# Patient Record
Sex: MALE | URBAN - METROPOLITAN AREA
[De-identification: names, ages, dates, MRNs, and addresses within clinical notes are randomized per-mention and may not be internally consistent; named-entity substitution may affect disease eponyms.]

---

## 2022-12-01 ENCOUNTER — OFFICE VISIT (OUTPATIENT)
Dept: BEHAVIORAL HEALTH | Facility: CLINIC | Age: 32
End: 2022-12-01
Payer: COMMERCIAL

## 2022-12-01 VITALS — SYSTOLIC BLOOD PRESSURE: 145 MMHG | DIASTOLIC BLOOD PRESSURE: 97 MMHG | OXYGEN SATURATION: 97 % | HEART RATE: 69 BPM

## 2022-12-01 DIAGNOSIS — F11.20 OPIOID USE DISORDER, MODERATE, DEPENDENCE (H): Primary | ICD-10-CM

## 2022-12-01 DIAGNOSIS — F11.93 OPIOID WITHDRAWAL (H): ICD-10-CM

## 2022-12-01 PROCEDURE — 80307 DRUG TEST PRSMV CHEM ANLYZR: CPT | Performed by: NURSE PRACTITIONER

## 2022-12-01 PROCEDURE — 99204 OFFICE O/P NEW MOD 45 MIN: CPT | Performed by: NURSE PRACTITIONER

## 2022-12-01 RX ORDER — CLONIDINE HYDROCHLORIDE 0.1 MG/1
0.1 TABLET ORAL 3 TIMES DAILY PRN
Qty: 21 TABLET | Refills: 0 | Status: SHIPPED | OUTPATIENT
Start: 2022-12-01 | End: 2022-12-08

## 2022-12-01 RX ORDER — GABAPENTIN 300 MG/1
300 CAPSULE ORAL 3 TIMES DAILY PRN
Qty: 21 CAPSULE | Refills: 0 | Status: SHIPPED | OUTPATIENT
Start: 2022-12-01 | End: 2022-12-08

## 2022-12-01 RX ORDER — BUPRENORPHINE AND NALOXONE 8; 2 MG/1; MG/1
1 FILM, SOLUBLE BUCCAL; SUBLINGUAL 2 TIMES DAILY
Qty: 14 FILM | Refills: 0 | Status: SHIPPED | OUTPATIENT
Start: 2022-12-01 | End: 2022-12-08

## 2022-12-01 RX ORDER — ONDANSETRON 4 MG/1
4 TABLET, ORALLY DISINTEGRATING ORAL EVERY 8 HOURS PRN
Qty: 12 TABLET | Refills: 0 | Status: SHIPPED | OUTPATIENT
Start: 2022-12-01 | End: 2022-12-08

## 2022-12-01 ASSESSMENT — COLUMBIA-SUICIDE SEVERITY RATING SCALE - C-SSRS
6. HAVE YOU EVER DONE ANYTHING, STARTED TO DO ANYTHING, OR PREPARED TO DO ANYTHING TO END YOUR LIFE?: NO
TOTAL  NUMBER OF ABORTED OR SELF INTERRUPTED ATTEMPTS LIFETIME: NO
TOTAL  NUMBER OF INTERRUPTED ATTEMPTS LIFETIME: NO
ATTEMPT LIFETIME: NO
1. HAVE YOU WISHED YOU WERE DEAD OR WISHED YOU COULD GO TO SLEEP AND NOT WAKE UP?: NO
2. HAVE YOU ACTUALLY HAD ANY THOUGHTS OF KILLING YOURSELF?: NO

## 2022-12-01 ASSESSMENT — PATIENT HEALTH QUESTIONNAIRE - PHQ9: SUM OF ALL RESPONSES TO PHQ QUESTIONS 1-9: 18

## 2022-12-01 NOTE — PROGRESS NOTES
[facility] Initial Visit    ASSESSMENT/PLAN                                                      1. Opioid use disorder, moderate, dependence (H)  - pt reports ~4 mo hx of fentanyl use, daily or every other day, hx significant for heroin use starting at age 20 yo, sustained remission ~ 10 yrs.   - Reviewed criteria met for OUD moderate. Reviewed available treatment options, MOUD. Plan to start Suboxone at this time. Reviewed home induction instructions. Wait at least 24 hours from last use prior to first dose of Suboxone. Instructions printed in AVS.   - Infectious disease screening as below   - Hepatitis C Screen Reflex to HCV RNA Quant and Genotype; Future  - HIV Antigen Antibody Combo; Future  - Hepatitis B Surface Antibody; Future  - Hepatitis B surface antigen; Future  - buprenorphine HCl-naloxone HCl (SUBOXONE) 8-2 MG per film; Place 1 Film under the tongue 2 times daily for 7 days After following home start instructions  Dispense: 14 Film; Refill: 0  - Fentanyl Urine, Qualitative; Future  - naloxone (NARCAN) 4 MG/0.1ML nasal spray; Spray 1 spray (4 mg) into one nostril alternating nostrils as needed for opioid reversal every 2-3 minutes until assistance arrives  Dispense: 0.2 mL; Refill: 11    2. Opioid withdrawal (H)  - Reviewed medications to treat opioid withdrawal symptoms as below.   - buprenorphine HCl-naloxone HCl (SUBOXONE) 8-2 MG per film; Place 1 Film under the tongue 2 times daily for 7 days After following home start instructions  Dispense: 14 Film; Refill: 0  - gabapentin (NEURONTIN) 300 MG capsule; Take 1 capsule (300 mg) by mouth 3 times daily as needed for other (withdrawal symptoms including anxiety, restlessness, sleeplessness)  Dispense: 21 capsule; Refill: 0  - cloNIDine (CATAPRES) 0.1 MG tablet; Take 1 tablet (0.1 mg) by mouth 3 times daily as needed (opioid withdrawal, anxiety, restlessness, irritability, hot/cold flashes)  Dispense: 21 tablet; Refill: 0  -  "ondansetron (ZOFRAN ODT) 4 MG ODT tab; Take 1 tablet (4 mg) by mouth every 8 hours as needed for nausea  Dispense: 12 tablet; Refill: 0    Pt denies suicidal thoughts or plan. No intent to harm self or others.      Return in about 1 week (around 12/8/2022) for Follow up, in person, with any available provider.    Patient counseling completed today:  Discussed mechanism of action, potential risks/benefits/side effects of medications and other recommendations above.    Discussed risk of precipitated withdrawal with initiation of buprenorphine in the presence of full opioid agonists.    Reviewed directions for initiation of buprenorphine to reduce risk of precipitated withdrawal and maximize efficacy.    Harm reduction counseling including never use alone, availability of naloxone, avoiding combination of opioids with benzodiazepines, alcohol, or other sedatives, safer administration.      Discussed importance of avoiding isolation, building a network of supportive relationships, avoiding people/places/things associated with past use to reduce risk of relapse; including motivational interviewing regarding psychosocial treatment for addiction.     SUBJECTIVE                                                      CC/HPI:  Mendel Torres is a 32 year old male with PMH of anxiety and opioid use disorder who presents to the [facility] for initial visit.      Brief History:  Mendel Torres was first seen in Recovery Clinic on 12/01/22. He was referred by a friend. Reports he started taking opioid pain pills a couple months ago, for back pain. Reports he could not get pain relief, he went to the doctor and was prescribed muscle relaxants and anti-inflammatories as well as physical therapy. Reports he then started buying perc 30's off the street, use became daily a couple months ago. Currently using 2-4 pills every other day to every day. Reports \"it has gone to far\" \" I want to be done with them\" Endorses a history of " heroin use 10 years ago, was able to stop using heroin without MOUD. Was 18 yo when he tried powdered heroin for the first time. Reports he did not struggle with sustaining remission from substance use at that time.  Last full opioid agonist use today, 12/1/22  at 11 AM.     Substance Use History :  Opioids:   Age at first use: 19  Current use: substance: Mbox 30's; quantity 2 per day; route: Insufflation ; timing of last use: 12/01/2022 about 11AM      IV drug use: Yes: Heroin [id number]   History of overdose: Yes: did not seek medical attention  Previous residential or outpatient treatments for addiction : No  Previous medication treatments for addiction: No  Longest period of sobriety: 10 years  Medical complications related to substance use: No  Hepatitis C: pt reports he was told he had hepatitis C at one point - no history of treatment.   HIV: Unknown; Date of most recent testing: Unknown    Taking buprenorphine? No As prescribed? N/A  Number of buprenorphine films/tablets remaining currently: 0  Side effects related to buprenorphine N/A  Narcan currently available: No    DSM-5 OUD criteria met:  Taken in larger amounts/greater time spent in behavior over longer period of time than intended,Yes   Persistent desire or unsuccessful efforts to cut down or control use/behavior, Yes   A great deal of time is spent in activities necessary to obtain the substance/participate in the behavior or recover from its effects, Yes   Cravings, Yes   Recurrent use/behavior resulting in failure to fulfill major role obligations at work, school, or home, No - was let go from job but reports not related to substance use.   Continued use/behavior despite having persistent or recurrent social or interpersonal problems caused or exacerbated by effects of use/behavior, Yes   Important social, occupational, or recreational activities are given up or reduced because of use/behavior, No   Recurrent use/behavior in situations in which it  "is physically hazardous, No   Continued use/behavior despite knowledge of having a persistent or recurrent physical or psychological problem that is likely to have been caused or exacerbated by use/behavior, No   Tolerance, Yes    Withdrawal, Yes     Other Addiction History:  Stimulants (cocaine, methamphetamine, MDMA/ecstasy)   Cocaine and Ectasy years ago  Sedatives/hypnotics/anxiolytics: (benzodiazepines, GHB, Ambien, phenobarbital): Reports trying xanax but no sustained use. \"I was not able to get xanax prescribed\" \"I liked how I worked off xanax\" \"Helped me focus\"   Alcohol:   Denies  Nicotine: (cigarettes, vaping, chew/snuff)  Denies  Cannabis:   Yes- daily use.   Hallucinogens/Dissociatives: (acid, mushrooms, ketamine)  Yes- mushrooms a couple times this past year.   Eating disorder:  Denies  Gambling:   No    [place] Prescription Drug Monitoring Program Reviewed:  Yes    No past medical history on file.    PAST PSYCHIATRIC HISTORY:  Diagnoses- anxiety and depression   Suicide Attempts: No   Hospitalizations: No     PHQ 12/1/2022   PHQ-9 Total Score 18   Q9: Thoughts of better off dead/self-harm past 2 weeks Several days       If PHQ-9 score of 15 or higher, has [facility] therapist or provider been notified? Yes    Any current suicidal ideation? No  If yes, has [facility] therapist or provider been notified? Yes    Mental health provider: Denies (follow up on  referral if needed)    No past surgical history on file.    Medications:  busPIRone (BUSPAR) 10 MG tablet, Take 10 mg by mouth 2 times daily  cyclobenzaprine (FLEXERIL) 10 MG tablet, Take 10 mg by mouth every 8 hours as needed for muscle spasms  hydrOXYzine (ATARAX) 25 MG tablet, Take 25 mg by mouth every 8 hours as needed for itching  naproxen (NAPROSYN) 500 MG tablet, Take 500 mg by mouth every 12 hours as needed for moderate pain (4-6)    No current facility-administered medications on file prior to visit.    Allergies   Allergen " Reactions     Cats Shortness Of Breath and Itching     Seafood Anaphylaxis       No family history on file.      Social History  Housing status: alone   Employment status: Unemployed, seeking work - Previously working at [employer] recently let go.    Relationship status: Single  Children: 1 child, 6 yo 7 yo in Jan. 2023- joint custody   Legal: Denies  Insurance needs: Unsure if Active  Contact information up to date? No, current cell phone number [phone]    3rd Party Involvement None today (please obtain TANISHA if pt would like to include)    REVIEW OF SYSTEMS:  General: Withdrawal symptoms as described below.  No recent fevers.   Eyes:  No vision concerns.  No jaundice.    Resp: No coughing, wheezing or shortness of breath  CV: No chest pains or palpitations  GI: No complaints other than as above  : No urinary frequency or dysuria, no discharge  Musculoskeletal: No significant muscle or joint pains other than as above.  No edema  Neurologic: No numbness, tingling, weakness, problems with balance or coordination  Psychiatric: No acute concerns other than as above.   Skin: No rashes or areas of acute infection    OBJECTIVE                                                      Clinical Opioid Withdrawal Scale (COWS)  Resting Pulse Rate  0  =  <=80    Sweating    (over past 1/2 hour) 1  =  subjective report of chills or flushing   Restlessness  1  =  reports difficulty sitting still, but is able to do so   Pupil size  0  =  pupils pinned or normal size for room light   Bone or Joint Aches    (acute only) 1  =  mild diffuse discomfort   Runny nose or tearing    (unrelated to cold/allergies) 1  =  nasal stuffiness or unusually moist eyes   GI Upset    (over past 1/2 hour) 1  =  stomach cramps   Tremor    (outstretched hands) 0  =  no tremor   Yawning    (during assessment) 0  =  no yawning   Anxiety/Irritability 1  =  patient reports increasing irritability or anxiousness   Gooseflesh skin 0  =  skin is smooth     TOTAL  SCORE  Add column for score   6       BP (!) 145/97 (BP Location: Left arm, Patient Position: Sitting)   Pulse 69   SpO2 97%     Physical Exam  Vitals and nursing note reviewed.   Constitutional:       General: He is not in acute distress.     Appearance: Normal appearance. He is not ill-appearing or diaphoretic.   HENT:      Nose: Congestion present.   Eyes:      General: No scleral icterus.     Extraocular Movements: Extraocular movements intact.      Conjunctiva/sclera: Conjunctivae normal.   Cardiovascular:      Rate and Rhythm: Normal rate.   Pulmonary:      Effort: Pulmonary effort is normal.   Neurological:      General: No focal deficit present.      Mental Status: He is alert and oriented to person, place, and time.   Psychiatric:         Attention and Perception: Attention and perception normal.         Mood and Affect: Mood is depressed. Mood is not anxious. Affect is not flat or tearful.         Speech: Speech normal. Speech is not rapid and pressured or slurred.         Behavior: Behavior is not slowed or withdrawn. Behavior is cooperative.         Thought Content: Thought content normal. Thought content does not include suicidal ideation. Thought content does not include suicidal plan.         Cognition and Memory: Cognition and memory normal.      Comments: Insight and judgment fair          Labs:  UDS: Urine Drug Screen Results  AMP: Negative  BAR: Negative  BUP: Negative  BZO: Negative  JOSE M: Negative  mAMP: Negative  MDMA : Negative  MTD: Negative  [id number]: Negative  OXY: Negative  PCP : Negative  THC : Positive  *POC urine drug screen does not screen for Fentanyl    Recent Results (from the past 720 hour(s))   Fentanyl Urine, Qualitative    Collection Time: 12/01/22  2:51 PM   Result Value Ref Range    Fentanyl Qual Urine Screen Positive (A) Screen Negative       PARRIS Christensen CNP on 12/1/2022 at 2:02 PM  [facility]  [address], [address]  [place]  [address]  [phone]

## 2022-12-01 NOTE — NURSING NOTE
RN was notified by rooming staff that the patient had an elevated PHQ-9 score and answered greater than 0 on question 9 indicating thoughts that they would be better off dead, or hurting themself in some way. RN met with patient to complete the C-SSRS.    Patient Patient reported it was a mistake that he answered question nine on the PHQ-9 with a score of 1.     Patient endorses denies current or recent suicidal ideation or behavior    Clinch-Suicide Severity Rating Scale (C-SSRS) score: no risk    RN updated the provider with C-SSRS risk level.     Current stressors include:     Patient denies    Patient identified the following protective factors: commitment to family    Patient was given the number for the National Suicide Prevention Line (988) along with a list of crisis resources and numbers.      Mel Calvillo RN on 12/1/2022 at 1:15 PM

## 2022-12-01 NOTE — PATIENT INSTRUCTIONS
When it has been AT LEAST 24 hours from your last use of other opioids:     Day 1: Take 2 mg of Suboxone SL film under your tongue, then wait 30 - 45 minutes.    If withdrawal symptoms are not worse, and cravings persist or symptoms are not relieved continue to take 2- 4 mg every 2 hours. Take up to 16 mg on Day 1 .      Day 2: take one 8-2 mg film every morning and another 8- 2 mg film every evening. Continue this until your next appointment.

## 2022-12-02 LAB — FENTANYL UR QL: ABNORMAL

## 2022-12-06 PROBLEM — F11.20 OPIOID USE DISORDER, MODERATE, DEPENDENCE (H): Status: ACTIVE | Noted: 2022-12-06

## 2022-12-06 RX ORDER — HYDROXYZINE HYDROCHLORIDE 25 MG/1
25 TABLET, FILM COATED ORAL EVERY 8 HOURS PRN
COMMUNITY
End: 2023-03-10

## 2022-12-06 RX ORDER — CYCLOBENZAPRINE HCL 10 MG
10 TABLET ORAL EVERY 8 HOURS PRN
COMMUNITY
End: 2023-03-10

## 2022-12-06 RX ORDER — NAPROXEN 500 MG/1
500 TABLET ORAL EVERY 12 HOURS PRN
COMMUNITY

## 2022-12-06 RX ORDER — BUSPIRONE HYDROCHLORIDE 10 MG/1
10 TABLET ORAL 2 TIMES DAILY
COMMUNITY
End: 2023-03-10

## 2022-12-08 ENCOUNTER — OFFICE VISIT (OUTPATIENT)
Dept: BEHAVIORAL HEALTH | Facility: CLINIC | Age: 32
End: 2022-12-08
Payer: COMMERCIAL

## 2022-12-08 VITALS — DIASTOLIC BLOOD PRESSURE: 79 MMHG | SYSTOLIC BLOOD PRESSURE: 149 MMHG | HEART RATE: 77 BPM

## 2022-12-08 DIAGNOSIS — F12.90 CONTINUOUS CANNABIS USE: ICD-10-CM

## 2022-12-08 DIAGNOSIS — F11.20 OPIOID USE DISORDER, MODERATE, DEPENDENCE (H): Primary | ICD-10-CM

## 2022-12-08 PROCEDURE — 80299 QUANTITATIVE ASSAY DRUG: CPT | Mod: 90 | Performed by: NURSE PRACTITIONER

## 2022-12-08 PROCEDURE — 99214 OFFICE O/P EST MOD 30 MIN: CPT | Performed by: NURSE PRACTITIONER

## 2022-12-08 PROCEDURE — 99000 SPECIMEN HANDLING OFFICE-LAB: CPT | Performed by: NURSE PRACTITIONER

## 2022-12-08 RX ORDER — BUPRENORPHINE AND NALOXONE 8; 2 MG/1; MG/1
1 FILM, SOLUBLE BUCCAL; SUBLINGUAL 2 TIMES DAILY
Qty: 28 FILM | Refills: 0 | Status: SHIPPED | OUTPATIENT
Start: 2022-12-08 | End: 2023-03-02

## 2022-12-08 ASSESSMENT — PATIENT HEALTH QUESTIONNAIRE - PHQ9: SUM OF ALL RESPONSES TO PHQ QUESTIONS 1-9: 15

## 2022-12-08 NOTE — PROGRESS NOTES
"[facility] Follow Up    ASSESSMENT/PLAN                                                      1. Opioid use disorder, moderate, dependence (H)  - S/P home induction with Suboxone. Reports cravings are well controlled. Withdrawal symptoms resolved. Continue Suboxone 8 mg SL BID.  Total daily dose 16 mg.  - UDS negative for buprenorphine today, unexpected, reports he has not taken the medication yet today. Will send for bup and metabolites as below.   - Buprenorphine & Metabolite Screen; Future  - buprenorphine HCl-naloxone HCl (SUBOXONE) 8-2 MG per film; Place 1 Film under the tongue 2 times daily  Dispense: 28 Film; Refill: 0    2. Continuous cannabis use  -Reports daily cannabis use.  No increased anxiety or psychosis.        Return in about 2 weeks (around 12/22/2022) for Follow up, with me, in person.    SUBJECTIVE                                                        CC/HPI:  Mendel Torres is a 32 year old male with PMH anxiety and opioid use disorder who presents to the [facility] for follow up regarding treatment of opioid use disorder.       Brief History:  Mendel Torres was first seen in Recovery Clinic on 12/01/22. He was referred by a friend. Reports he started taking opioid pain pills a couple months ago, for back pain. Reports he could not get pain relief, he went to the doctor and was prescribed muscle relaxants and anti-inflammatories as well as physical therapy. Reports he then started buying perc 30's off the street, use became daily a couple months ago. Currently using 2-4 pills every other day to every day. Reports \"it has gone to far\" \" I want to be done with them\" Endorses a history of heroin use 10 years ago, was able to stop using heroin without MOUD. Was 20 yo when he tried powdered heroin for the first time. Reports he did not struggle with sustaining remission from substance use at that time.  Last full opioid agonist use 12/1/22  at 11 AM. Started on " "Suboxone at initial visit, stable on 16 mg per day.      Substance Use History :  Opioids:   Age at first use: 19  Current use: substance: Mbox 30's; quantity 2 per day; route: Insufflation ; timing of last use: 12/01/2022 about 11AM                 IV drug use: Yes: Heroin [id number]   History of overdose: Yes: did not seek medical attention  Previous residential or outpatient treatments for addiction : No  Previous medication treatments for addiction: No  Longest period of sobriety: 10 years  Medical complications related to substance use: No  Hepatitis C: pt reports he was told he had hepatitis C at one point - no history of treatment.   HIV: Unknown; Date of most recent testing: Unknown     Other Addiction History:  Stimulants (cocaine, methamphetamine, MDMA/ecstasy)   Cocaine and Ectasy years ago  Sedatives/hypnotics/anxiolytics: (benzodiazepines, GHB, Ambien, phenobarbital): Reports trying xanax but no sustained use. \"I was not able to get xanax prescribed\" \"I liked how I worked off xanax\" \"Helped me focus\"   Alcohol:   Denies  Nicotine: (cigarettes, vaping, chew/snuff)  Denies  Cannabis:   Yes- daily use.   Hallucinogens/Dissociatives: (acid, mushrooms, ketamine)  Yes- mushrooms a couple times this past year.   Eating disorder:  Denies  Gambling:              No      Most recent Recovery Clinic visit 12/2/22    A/P last visit:  1. Opioid use disorder, moderate, dependence (H)  - pt reports ~4 mo hx of fentanyl use, daily or every other day, hx significant for heroin use starting at age 18 yo, sustained remission ~ 10 yrs.   - Reviewed criteria met for OUD moderate. Reviewed available treatment options, MOUD. Plan to start Suboxone at this time. Reviewed home induction instructions. Wait at least 24 hours from last use prior to first dose of Suboxone. Instructions printed in AVS.   - Infectious disease screening as below   - Hepatitis C Screen Reflex to HCV RNA Quant and Genotype; Future  - HIV Antigen Antibody " Combo; Future  - Hepatitis B Surface Antibody; Future  - Hepatitis B surface antigen; Future  - buprenorphine HCl-naloxone HCl (SUBOXONE) 8-2 MG per film; Place 1 Film under the tongue 2 times daily for 7 days After following home start instructions  Dispense: 14 Film; Refill: 0  - Fentanyl Urine, Qualitative; Future  - naloxone (NARCAN) 4 MG/0.1ML nasal spray; Spray 1 spray (4 mg) into one nostril alternating nostrils as needed for opioid reversal every 2-3 minutes until assistance arrives  Dispense: 0.2 mL; Refill: 11     2. Opioid withdrawal (H)  - Reviewed medications to treat opioid withdrawal symptoms as below.   - buprenorphine HCl-naloxone HCl (SUBOXONE) 8-2 MG per film; Place 1 Film under the tongue 2 times daily for 7 days After following home start instructions  Dispense: 14 Film; Refill: 0  - gabapentin (NEURONTIN) 300 MG capsule; Take 1 capsule (300 mg) by mouth 3 times daily as needed for other (withdrawal symptoms including anxiety, restlessness, sleeplessness)  Dispense: 21 capsule; Refill: 0  - cloNIDine (CATAPRES) 0.1 MG tablet; Take 1 tablet (0.1 mg) by mouth 3 times daily as needed (opioid withdrawal, anxiety, restlessness, irritability, hot/cold flashes)  Dispense: 21 tablet; Refill: 0  - ondansetron (ZOFRAN ODT) 4 MG ODT tab; Take 1 tablet (4 mg) by mouth every 8 hours as needed for nausea  Dispense: 12 tablet; Refill: 0     Pt denies suicidal thoughts or plan. No intent to harm self or others.     12/08/22, pt states :   - Reports home induction went well. Was able to start on Suboxone.  Denies precipitated withdrawal.  Required minimal opioid withdrawal medications. He is now taking Suboxone 8 mg SL BID. Reports cravings are controlled.  Denies adverse side effects.  No constipation, leg swelling, diaphoresis, dry mouth.  Does still have some back pain. Went to physical therapy today.   -Reports he has not taken a Suboxone dose yet today.   - Smoking cannabis daily, no increase anxiety or  psychosis.   -Without other concerns    [place] Prescription Drug Monitoring Program Reviewed:  Yes  12/01/2022  1   12/01/2022  Buprenorphine-Nalox 8-2mg Film  14.00  7       Medications:  busPIRone (BUSPAR) 10 MG tablet, Take 10 mg by mouth 2 times daily  cyclobenzaprine (FLEXERIL) 10 MG tablet, Take 10 mg by mouth every 8 hours as needed for muscle spasms  hydrOXYzine (ATARAX) 25 MG tablet, Take 25 mg by mouth every 8 hours as needed for itching  naloxone (NARCAN) 4 MG/0.1ML nasal spray, Spray 1 spray (4 mg) into one nostril alternating nostrils as needed for opioid reversal every 2-3 minutes until assistance arrives  naproxen (NAPROSYN) 500 MG tablet, Take 500 mg by mouth every 12 hours as needed for moderate pain (4-6)    No current facility-administered medications on file prior to visit.      Allergies   Allergen Reactions     Cats Shortness Of Breath and Itching     Seafood Anaphylaxis       PMH, PSH, FamHx reviewed  PAST PSYCHIATRIC HISTORY:  Diagnoses- anxiety and depression   Suicide Attempts: No   Hospitalizations: No     Social History  Housing status: alone   Employment status: Unemployed, seeking work - Previously working at [employer] recently let go.    Relationship status: Single  Children: 1 child, 6 yo 9 yo in Jan. 2023- joint custody   Legal: Denies  Insurance needs: Unsure if Active  Contact information up to date? No, current cell phone number [phone]    OBJECTIVE                                                      BP (!) 149/79   Pulse 77     Physical Exam  Constitutional:       General: He is not in acute distress.     Appearance: Normal appearance.   Eyes:      General: No scleral icterus.     Extraocular Movements: Extraocular movements intact.      Conjunctiva/sclera: Conjunctivae normal.   Cardiovascular:      Rate and Rhythm: Normal rate.   Pulmonary:      Effort: Pulmonary effort is normal.   Neurological:      General: No focal deficit present.      Mental Status: He is alert and  oriented to person, place, and time.   Psychiatric:         Attention and Perception: Attention and perception normal.         Speech: Speech normal. Speech is not rapid and pressured or slurred.         Behavior: Behavior is cooperative.         Thought Content: Thought content normal.         Cognition and Memory: Cognition and memory normal.      Comments: Mood and affect are congruent with subject matter.  Insight adequate and judgment appropriate         Labs:    UDS:   Point of care urine drug screen positive for:  Urine Drug Screen Results  AMP: Negative  BAR: Negative  BUP: Negative  BZO: Negative  JOSE M: Negative  mAMP: Negative  MDMA : Negative  MTD: Negative  [id number]: Negative  OXY: Negative  PCP : Negative  THC : Positive  *POC urine drug screen does not screen for Fentanyl    Recent Results (from the past 240 hour(s))   Fentanyl Urine, Qualitative    Collection Time: 12/01/22  2:51 PM   Result Value Ref Range    Fentanyl Qual Urine Screen Positive (A) Screen Negative       Patient counseling completed today:  Discussed mechanism of action, potential risks/benefits/side effects of medications and other recommendations above.  Recommend pt keep naloxone in their possession and reviewed other aspects of harm reduction to reduce risk of overdose with return to use.   Recommended avoiding concurrent use of alcohol, benzodiazepines or other sedatives with buprenorphine or other opioids.  Discussed importance of avoiding isolation, building a network of supportive relationships, avoiding people/places/things associated with past use to reduce risk of relapse; including motivational interviewing regarding psychosocial treatment for addiction.     PARRIS Christensen [place] [id number] [place] [id number]  [phone]

## 2022-12-08 NOTE — NURSING NOTE
[facility]      Rooming information:  Approximate last use of full opioid agonist: 12/4/22  Taking buprenorphine? Yes:  As prescribed? Yes:   Number of buprenorphine films/tablets remaining currently: 0  Side effects related to buprenorphine (constipation, dry mouth, sedation?) No   Narcan currently available: Yes  Other recent substance use:    Denies  NICOTINE-No    Point of care urine drug screen positive for:  Urine Drug Screen Results  AMP: Negative  BAR: Negative  BUP: Negative  BZO: Negative  JOSE M: Negative  mAMP: Negative  MDMA : Negative  MTD: Negative  [id number]: Negative  OXY: Negative  PCP : Negative  THC : Positive  *POC urine drug screen does not screen for Fentanyl      PHQ Assesment Total Score(s) 12/8/2022   PHQ-9 Score 15   Some recent data might be hidden       If PHQ-9 score of 15 or higher, has Recovery Clinic therapist or provider been notified? Yes    Any current suicidal ideation? No  If yes, has [facility] therapist or provider been notified? N/A    Primary care provider: Adele Nam MD (Inactive)     Mental health provider: denies (follow up on MH referral if needed)    Insurance needs: active    Housing needs: stable    Contact information up to date? yes    3rd Party Involvement not todsay (please obtain TANISHA if pt would like to include)    Dewayne Perla MA  December 8, 2022  12:45 PM

## 2022-12-11 LAB
BUPRENORPHINE UR-MCNC: 6 NG/ML
BUPRENORPHINE UR-MCNC: <2 NG/ML
NALOXONE UR CFM-MCNC: <100 NG/ML
NORBUPRENORPHINE UR CFM-MCNC: 42 NG/ML
NORBUPRENORPHINE UR-MCNC: 8 NG/ML

## 2023-01-08 ENCOUNTER — HEALTH MAINTENANCE LETTER (OUTPATIENT)
Age: 33
End: 2023-01-08

## 2023-02-28 ENCOUNTER — TELEPHONE (OUTPATIENT)
Dept: BEHAVIORAL HEALTH | Facility: CLINIC | Age: 33
End: 2023-02-28
Payer: COMMERCIAL

## 2023-03-02 ENCOUNTER — OFFICE VISIT (OUTPATIENT)
Dept: BEHAVIORAL HEALTH | Facility: CLINIC | Age: 33
End: 2023-03-02
Payer: COMMERCIAL

## 2023-03-02 VITALS — DIASTOLIC BLOOD PRESSURE: 102 MMHG | HEART RATE: 91 BPM | SYSTOLIC BLOOD PRESSURE: 147 MMHG

## 2023-03-02 DIAGNOSIS — F11.20 OPIOID USE DISORDER, MODERATE, DEPENDENCE (H): Primary | ICD-10-CM

## 2023-03-02 DIAGNOSIS — F11.93 OPIOID WITHDRAWAL (H): ICD-10-CM

## 2023-03-02 LAB — FENTANYL UR QL: ABNORMAL

## 2023-03-02 PROCEDURE — 99213 OFFICE O/P EST LOW 20 MIN: CPT | Performed by: NURSE PRACTITIONER

## 2023-03-02 PROCEDURE — 80307 DRUG TEST PRSMV CHEM ANLYZR: CPT | Performed by: NURSE PRACTITIONER

## 2023-03-02 RX ORDER — ONDANSETRON 4 MG/1
4 TABLET, ORALLY DISINTEGRATING ORAL EVERY 8 HOURS PRN
Qty: 12 TABLET | Refills: 0 | Status: SHIPPED | OUTPATIENT
Start: 2023-03-02 | End: 2023-03-10

## 2023-03-02 RX ORDER — DIGITAL THERAPEUTICS, OUD
MISCELLANEOUS MISCELLANEOUS
Qty: 1 EACH | Refills: 3 | Status: SHIPPED | OUTPATIENT
Start: 2023-03-02

## 2023-03-02 RX ORDER — GABAPENTIN 300 MG/1
300 CAPSULE ORAL 3 TIMES DAILY PRN
Qty: 21 CAPSULE | Refills: 0 | Status: SHIPPED | OUTPATIENT
Start: 2023-03-02 | End: 2023-03-10

## 2023-03-02 RX ORDER — TRAZODONE HYDROCHLORIDE 50 MG/1
50 TABLET, FILM COATED ORAL AT BEDTIME
Qty: 15 TABLET | Refills: 0 | Status: SHIPPED | OUTPATIENT
Start: 2023-03-02 | End: 2023-03-10

## 2023-03-02 RX ORDER — BUPRENORPHINE AND NALOXONE 8; 2 MG/1; MG/1
1 FILM, SOLUBLE BUCCAL; SUBLINGUAL 2 TIMES DAILY
Qty: 28 FILM | Refills: 0 | Status: SHIPPED | OUTPATIENT
Start: 2023-03-02 | End: 2023-03-10

## 2023-03-02 RX ORDER — CLONIDINE HYDROCHLORIDE 0.1 MG/1
0.1 TABLET ORAL 3 TIMES DAILY PRN
Qty: 21 TABLET | Refills: 0 | Status: SHIPPED | OUTPATIENT
Start: 2023-03-02 | End: 2023-03-10

## 2023-03-02 ASSESSMENT — PATIENT HEALTH QUESTIONNAIRE - PHQ9: SUM OF ALL RESPONSES TO PHQ QUESTIONS 1-9: 4

## 2023-03-02 NOTE — NURSING NOTE
[facility]      Rooming information:  Approximate last use of full opioid agonist: 3/2/23  Taking buprenorphine? No,  Its been a few month  Number of buprenorphine films/tablets remaining currently: 0  Side effects related to buprenorphine (constipation, dry mouth, sedation?) No   Narcan currently available: Yes  Other recent substance use:    Denies  NICOTINE-No      Point of care urine drug screen positive for:  Urine Drug Screen Results  AMP: Negative  BAR: Negative  BUP: Negative  BZO: Negative ([place])  JOSE M: Negative  mAMP: Negative  MDMA : Negative ([place])  MTD: Negative  [id number]: Negative  OXY: Negative  PCP : Negative  THC : Negative  *POC urine drug screen does not screen for Fentanyl        PHQ Assesment Total Score(s) 3/2/2023   PHQ-9 Score 4   Some recent data might be hidden       If PHQ-9 score of 15 or higher, has Recovery Clinic therapist or provider been notified? No    Any current suicidal ideation? No  If yes, has [facility] therapist or provider been notified? N/A    Primary care provider: Adele Nam MD (Inactive)     Mental health provider: denies (follow up on MH referral if needed)    Insurance needs: maybe , was through employer and got laid off    Housing needs: stable    Contact information up to date? yes    3rd Party Involvement not today (please obtain TANISHA if pt would like to include)    Dewayne Perla MA  2023  3:14 PM

## 2023-03-02 NOTE — PROGRESS NOTES
[facility] Follow Up    ASSESSMENT/PLAN                                                      1. Opioid use disorder, moderate, dependence (H)  - Return to full opioid agonist use. Plan to resume Suboxone. Reviewed home induction instructions and printed in AVS. Resume previously effective 16 mg per day. Wait at least 24 hrs from last use prior to starting Suboxone.   - Confirms access to narcan   - Pt is not interested in CE eval and treatment at this time   - Discussed ReSet - O virtual programming, prescription sent today.   - Fentanyl Urine, Qualitative; Future  - buprenorphine HCl-naloxone HCl (SUBOXONE) 8-2 MG per film; Place 1 Film under the tongue 2 times daily  Dispense: 28 Film; Refill: 0  - DTx Griffin - Subst Use Disorder (RESET-O); Use as directed for 84 days.  Dispense: 1 each; Refill: 3    2. Opioid withdrawal (H)  - Reviewed use of below medications to treat opioid withdrawal symptoms.   - cloNIDine (CATAPRES) 0.1 MG tablet; Take 1 tablet (0.1 mg) by mouth 3 times daily as needed (opioid withdrawal, anxiety, restlessness, irritability, hot/cold flashes)  Dispense: 21 tablet; Refill: 0  - gabapentin (NEURONTIN) 300 MG capsule; Take 1 capsule (300 mg) by mouth 3 times daily as needed for other (withdrawal symptoms including anxiety, restlessness, sleeplessness)  Dispense: 21 capsule; Refill: 0  - traZODone (DESYREL) 50 MG tablet; Take 1 tablet (50 mg) by mouth At Bedtime  Dispense: 15 tablet; Refill: 0  - ondansetron (ZOFRAN ODT) 4 MG ODT tab; Take 1 tablet (4 mg) by mouth every 8 hours as needed for nausea  Dispense: 12 tablet; Refill: 0      Return in about 1 week (around 3/9/2023) for Follow up, with any available provider, in person.    SUBJECTIVE                                                        CC/HPI:  Mendel Torres is a 32 year old male with PMH anxiety and opioid use disorder who presents to the [facility] for follow up regarding treatment of opioid use  "disorder.       Brief History:  Mendel Torres was first seen in Recovery Clinic on 12/01/22. He was referred by a friend. Reports he started taking opioid pain pills a couple months ago, for back pain. Reports he could not get pain relief, he went to the doctor and was prescribed muscle relaxants and anti-inflammatories as well as physical therapy. Reports he then started buying perc 30's off the street, use became daily a couple months ago. Currently using 2-4 pills every other day to every day. Reports \"it has gone to far\" \" I want to be done with them\" Endorses a history of heroin use 10 years ago, was able to stop using heroin without MOUD. Was 18 yo when he tried powdered heroin for the first time. Reports he did not struggle with sustaining remission from substance use at that time.  Last full opioid agonist use 12/1/22  at 11 AM. Started on Suboxone at initial visit, stable on 16 mg per day. Lost to follow up from 12/8/23 - 3/1/23.      Substance Use History :  Opioids:   Age at first use: 19  Current use: substance: Mbox 30's (illictly manufactured fentanyl); quantity 6-8 pills per day; route: Insufflation ; timing of last use: 3/2/23 at 10 PM.                 IV drug use: Yes: Heroin [id number]   History of overdose: Yes: did not seek medical attention  Previous residential or outpatient treatments for addiction : No  Previous medication treatments for addiction: No  Longest period of sobriety: 10 years  Medical complications related to substance use: No  Hepatitis C: pt reports he was told he had hepatitis C at one point - no history of treatment.   HIV: Unknown; Date of most recent testing: Unknown     Other Addiction History:  Stimulants (cocaine, methamphetamine, MDMA/ecstasy)   Cocaine and Ectasy years ago  Sedatives/hypnotics/anxiolytics: (benzodiazepines, GHB, Ambien, phenobarbital): Reports trying xanax but no sustained use. \"I was not able to get xanax prescribed\" \"I liked how I worked off xanax\" " "\"Helped me focus\"   Alcohol:   Denies  Nicotine: (cigarettes, vaping, chew/snuff)  Denies  Cannabis:   Yes- daily use.   Hallucinogens/Dissociatives: (acid, mushrooms, ketamine)  Yes- mushrooms a couple times this past year.   Eating disorder:  Denies  Gambling:              No       PMH, PSH, FamHx reviewed  PAST PSYCHIATRIC HISTORY:  Diagnoses- anxiety and depression   Suicide Attempts: No   Hospitalizations: No      Social History  Housing status: alone   Employment status: Unemployed, seeking work - Previously working at [employer] recently let go.    Relationship status: Single  Children: 1 child, 6 yo 9 yo in Jan. 2023- joint custody   Legal: Denies  Insurance needs: Unsure if Active  Contact information up to date? No, current cell phone number [phone]    Most recent Recovery Clinic visit 12/8/22  A/P last visit:  1. Opioid use disorder, moderate, dependence (H)  - S/P home induction with Suboxone. Reports cravings are well controlled. Withdrawal symptoms resolved. Continue Suboxone 8 mg SL BID.  Total daily dose 16 mg.  - UDS negative for buprenorphine today, unexpected, reports he has not taken the medication yet today. Will send for bup and metabolites as below.   - Buprenorphine & Metabolite Screen; Future  - buprenorphine HCl-naloxone HCl (SUBOXONE) 8-2 MG per film; Place 1 Film under the tongue 2 times daily  Dispense: 28 Film; Refill: 0     2. Continuous cannabis use  -Reports daily cannabis use.  No increased anxiety or psychosis.      Interval Hx:   Component      Latest Ref Rng & Units 12/8/2022   Buprenorphine, Urn, Quant      ng/mL <2   Norbuprenorphine, Urn, Quant      ng/mL 8   Buprenorphine Gluc, Urn, Quant      ng/mL 6   Norbuprenorphine Gluc, Urn, Quant      ng/mL 42   Naloxone, Urn, Quant      ng/mL <100       03/02/23 visit:  - here today for follow up. Last seen 12/8/23. Reports he has been using Perc 30 pills daily - 6-8 pills per day, last use yesterday at 10 PM. Would like to resume " Suboxone.   - Going for a back injection in [facility] tomorrow, has been going to [facility]. Also has done Spinal decompression therapy. Hopefully back injection will relieve pain.   - Feels back and leg pain contribute to use of fentanyl.   - Currently endorses withdrawal symptoms including body aches, nausea, chills, insomnia, runny nose.   - he is contemplative regarding treatment. Open to ReSet - O     [place] Prescription Drug Monitoring Program Reviewed:  Yes  01/06/2023  1   12/08/2022  Buprenorphine-Nalox 8-2mg Film  28.00  14       Medications:  busPIRone (BUSPAR) 10 MG tablet, Take 10 mg by mouth 2 times daily  cyclobenzaprine (FLEXERIL) 10 MG tablet, Take 10 mg by mouth every 8 hours as needed for muscle spasms  hydrOXYzine (ATARAX) 25 MG tablet, Take 25 mg by mouth every 8 hours as needed for itching  naloxone (NARCAN) 4 MG/0.1ML nasal spray, Spray 1 spray (4 mg) into one nostril alternating nostrils as needed for opioid reversal every 2-3 minutes until assistance arrives  naproxen (NAPROSYN) 500 MG tablet, Take 500 mg by mouth every 12 hours as needed for moderate pain (4-6)    No current facility-administered medications on file prior to visit.      Allergies   Allergen Reactions     Cats Shortness Of Breath and Itching     Seafood Anaphylaxis       PMH, PSH, FamHx reviewed      OBJECTIVE                                                      BP (!) 147/102   Pulse 91   PHQ 12/1/2022 12/8/2022 3/2/2023   PHQ-9 Total Score 18 15 4   Q9: Thoughts of better off dead/self-harm past 2 weeks Several days Not at all Not at all         Physical Exam  Constitutional:       General: He is not in acute distress.     Appearance: Normal appearance. He is not ill-appearing or diaphoretic.   HENT:      Nose: Rhinorrhea present.   Eyes:      Extraocular Movements: Extraocular movements intact.   Cardiovascular:      Rate and Rhythm: Normal rate.   Pulmonary:      Effort: Pulmonary effort is normal.   Neurological:       Mental Status: He is alert and oriented to person, place, and time.   Psychiatric:         Attention and Perception: Attention and perception normal.         Mood and Affect: Mood is anxious. Mood is not depressed. Affect is not flat.         Speech: Speech normal. Speech is not rapid and pressured, delayed or slurred.         Behavior: Behavior is cooperative.         Thought Content: Thought content normal. Thought content does not include suicidal ideation. Thought content does not include suicidal plan.         Cognition and Memory: Cognition and memory normal.      Comments: Insight and judgment fair          Labs:    UDS:  03/02/23  Urine Drug Screen Results  AMP: Negative  BAR: Negative  BUP: Negative  BZO: Negative ([place])  JOSE M: Negative  mAMP: Negative  MDMA : Negative ([place])  MTD: Negative  [id number]: Negative  OXY: Negative  PCP : Negative  THC : Negative  *POC urine drug screen does not screen for Fentanyl      No results found for this or any previous visit (from the past 240 hour(s)).      Patient counseling completed today:  Discussed mechanism of action, potential risks/benefits/side effects of medications and other recommendations above.  Recommend pt keep naloxone in their possession and reviewed other aspects of harm reduction to reduce risk of overdose with return to use.   Recommended avoiding concurrent use of alcohol, benzodiazepines or other sedatives with buprenorphine or other opioids.      PARRIS [facility]  [facility]  [address] [place] [id number] [phone]

## 2023-03-06 ENCOUNTER — TELEPHONE (OUTPATIENT)
Dept: BEHAVIORAL HEALTH | Facility: CLINIC | Age: 33
End: 2023-03-06
Payer: COMMERCIAL

## 2023-03-06 NOTE — TELEPHONE ENCOUNTER
Writer spoke with patient who reports the medication discussed with the provider for sleep was not listed on his AVS. Writer explained to patient that the prescription was confirmed received by the pharmacy. Writer spoke with [facility] who reports the Trazodone prescription has been filled and is ready for .     Mel Calvillo RN on 3/6/2023 at 2:45 PM

## 2023-03-06 NOTE — TELEPHONE ENCOUNTER
Reason for call:  Other   Patient called regarding (reason for call): prescription  Additional comments: provider lisa Mckeon talked with pt/ about sleep med's on last apt. Pt did not receive sleep med's with other prescriptions and is requesting them to be sent to pharmacy -please sent med's to same pharmacy    phone: [phone]    Best Time: any     Can we leave a detailed message on this number?  YES    Travel screening: Negative

## 2023-03-10 ENCOUNTER — OFFICE VISIT (OUTPATIENT)
Dept: BEHAVIORAL HEALTH | Facility: CLINIC | Age: 33
End: 2023-03-10
Payer: COMMERCIAL

## 2023-03-10 VITALS — DIASTOLIC BLOOD PRESSURE: 98 MMHG | HEART RATE: 91 BPM | SYSTOLIC BLOOD PRESSURE: 132 MMHG | WEIGHT: 175 LBS

## 2023-03-10 DIAGNOSIS — G47.09 OTHER INSOMNIA: ICD-10-CM

## 2023-03-10 DIAGNOSIS — G89.29 CHRONIC LOW BACK PAIN WITH LEFT-SIDED SCIATICA, UNSPECIFIED BACK PAIN LATERALITY: ICD-10-CM

## 2023-03-10 DIAGNOSIS — F11.20 OPIOID USE DISORDER, MODERATE, DEPENDENCE (H): Primary | ICD-10-CM

## 2023-03-10 DIAGNOSIS — M54.42 CHRONIC LOW BACK PAIN WITH LEFT-SIDED SCIATICA, UNSPECIFIED BACK PAIN LATERALITY: ICD-10-CM

## 2023-03-10 LAB
AMPHETAMINE QUAL URINE POCT: NEGATIVE
BARBITURATE QUAL URINE POCT: NEGATIVE
BENZODIAZEPINE QUAL URINE POCT: NEGATIVE
BUPRENORPHINE QUAL URINE POCT: ABNORMAL
COCAINE QUAL URINE POCT: NEGATIVE
CREATININE QUAL URINE POCT: ABNORMAL
INTERNAL QC QUAL URINE POCT: ABNORMAL
MDMA QUAL URINE POCT: NEGATIVE
METHADONE QUAL URINE POCT: NEGATIVE
METHAMPHETAMINE QUAL URINE POCT: NEGATIVE
OPIATE QUAL URINE POCT: NEGATIVE
OXYCODONE QUAL URINE POCT: NEGATIVE
PH QUAL URINE POCT: ABNORMAL
PHENCYCLIDINE QUAL URINE POCT: NEGATIVE
POCT KIT EXPIRATION DATE: ABNORMAL
POCT KIT LOT NUMBER: ABNORMAL
SPECIFIC GRAVITY POCT: 1.01
TEMPERATURE URINE POCT: ABNORMAL
THC QUAL URINE POCT: ABNORMAL

## 2023-03-10 PROCEDURE — 99214 OFFICE O/P EST MOD 30 MIN: CPT | Performed by: FAMILY MEDICINE

## 2023-03-10 PROCEDURE — 80305 DRUG TEST PRSMV DIR OPT OBS: CPT | Performed by: FAMILY MEDICINE

## 2023-03-10 RX ORDER — GABAPENTIN 300 MG/1
300 CAPSULE ORAL 3 TIMES DAILY
Qty: 45 CAPSULE | Refills: 0 | Status: SHIPPED | OUTPATIENT
Start: 2023-03-10

## 2023-03-10 RX ORDER — BUPRENORPHINE AND NALOXONE 8; 2 MG/1; MG/1
1 FILM, SOLUBLE BUCCAL; SUBLINGUAL 3 TIMES DAILY
Qty: 45 FILM | Refills: 0 | Status: SHIPPED | OUTPATIENT
Start: 2023-03-10

## 2023-03-10 RX ORDER — TRAZODONE HYDROCHLORIDE 50 MG/1
50-100 TABLET, FILM COATED ORAL
Qty: 60 TABLET | Refills: 0 | Status: SHIPPED | OUTPATIENT
Start: 2023-03-10

## 2023-03-10 RX ORDER — GABAPENTIN 300 MG/1
300 CAPSULE ORAL 3 TIMES DAILY PRN
Qty: 45 CAPSULE | Refills: 0 | Status: SHIPPED | OUTPATIENT
Start: 2023-03-10 | End: 2023-03-10

## 2023-03-10 RX ORDER — GABAPENTIN 300 MG/1
300 CAPSULE ORAL 3 TIMES DAILY
Qty: 45 CAPSULE | Refills: 0 | COMMUNITY
Start: 2023-03-10

## 2023-03-10 ASSESSMENT — PAIN SCALES - GENERAL: PAINLEVEL: NO PAIN (0)

## 2023-03-10 ASSESSMENT — PATIENT HEALTH QUESTIONNAIRE - PHQ9: SUM OF ALL RESPONSES TO PHQ QUESTIONS 1-9: 5

## 2023-03-10 NOTE — PROGRESS NOTES
"[facility] Follow Up    ASSESSMENT/PLAN                                                      1. Opioid use disorder, moderate, dependence (H)  Uncontrolled, continues with withdrawal symptoms.   Increase buprenorphine to 24mg TDD (recommend 8mg tid for analgesic benefits)  Pt has been prescribed RESET-O  - buprenorphine HCl-naloxone HCl (SUBOXONE) 8-2 MG per film; Place 1 Film under the tongue 3 times daily  Dispense: 45 Film; Refill: 0  - Drugs of Abuse Screen Urine (POC CUPS) POCT; Standing    2. Chronic low back pain with left-sided sciatica, unspecified back pain laterality  Buprenorphine 8mg tid as noted  Continue gabapentin 300mg tid to address neuropathic component of pain  F/u with [facility]  - gabapentin (NEURONTIN) 300 MG capsule; Take 1 capsule (300 mg) by mouth 3 times daily  Dispense: 45 capsule; Refill: 0    3. Other insomnia  Continue trazodone, ok to increase to 100mg/night if needed  - traZODone (DESYREL) 50 MG tablet; Take 1-2 tablets ( mg) by mouth nightly as needed for sleep  Dispense: 60 tablet; Refill: 0    Return in about 2 weeks (around 3/24/2023) for Follow up, in person.    SUBJECTIVE                                                        CC/HPI:  Mendel Torres is a 32 year old male with PMH anxiety and opioid use disorder who presents to the [facility] for follow up regarding treatment of opioid use disorder.       Brief History:  Mendel Torres was first seen in Recovery Clinic on 12/01/22. He was referred by a friend. Reports he started taking opioid pain pills a couple months ago, for back pain. Reports he could not get pain relief, he went to the doctor and was prescribed muscle relaxants and anti-inflammatories as well as physical therapy. Reports he then started buying perc 30's off the street, use became daily a couple months ago. Currently using 2-4 pills every other day to every day. Reports \"it has gone to far\" \" I want to be done with " "them\" Endorses a history of heroin use 10 years ago, was able to stop using heroin without MOUD. Was 20 yo when he tried powdered heroin for the first time. Reports he did not struggle with sustaining remission from substance use at that time.  Last full opioid agonist use 12/1/22  at 11 AM. Started on Suboxone at initial visit, stable on 16 mg per day. Lost to follow up from 12/8/23 - 3/1/23.      Substance Use History :  Opioids:   Age at first use: 19  Current use: substance: Mbox 30's (illictly manufactured fentanyl); quantity 6-8 pills per day; route: Insufflation ; timing of last use: 3/2/23 at 10 PM.                 IV drug use: Yes: Heroin [id number]   History of overdose: Yes: did not seek medical attention  Previous residential or outpatient treatments for addiction : No  Previous medication treatments for addiction: No  Longest period of sobriety: 10 years  Medical complications related to substance use: No  Hepatitis C: pt reports he was told he had hepatitis C at one point - no history of treatment.   HIV: Unknown; Date of most recent testing: Unknown     Other Addiction History:  Stimulants (cocaine, methamphetamine, MDMA/ecstasy)   Cocaine and Ectasy years ago  Sedatives/hypnotics/anxiolytics: (benzodiazepines, GHB, Ambien, phenobarbital): Reports trying xanax but no sustained use. \"I was not able to get xanax prescribed\" \"I liked how I worked off xanax\" \"Helped me focus\"   Alcohol:   Denies  Nicotine: (cigarettes, vaping, chew/snuff)  Denies  Cannabis:   Yes- daily use.   Hallucinogens/Dissociatives: (acid, mushrooms, ketamine)  Yes- mushrooms a couple times this past year.   Eating disorder:  Denies  Gambling:              No       PMH, PSH, FamHx reviewed  PAST PSYCHIATRIC HISTORY:  Diagnoses- anxiety and depression   Suicide Attempts: No   Hospitalizations: No      Social History  Housing status: alone   Employment status: Unemployed, seeking work - Previously working at [employer] laid off " 12/2022    Relationship status: Single  Children: 1 child, 8 yo 7 yo in Jan. 2023- joint custody   Legal: Denies  Insurance needs: BCBS per pt  Contact information up to date? No, current cell phone number [phone]      Most recent Recovery Clinic visit 3/2/23  A/P last visit:  1. Opioid use disorder, moderate, dependence (H)  - Return to full opioid agonist use. Plan to resume Suboxone. Reviewed home induction instructions and printed in AVS. Resume previously effective 16 mg per day. Wait at least 24 hrs from last use prior to starting Suboxone.   - Confirms access to narcan   - Pt is not interested in CE eval and treatment at this time   - Discussed ReSet - O virtual programming, prescription sent today.   - Fentanyl Urine, Qualitative; Future  - buprenorphine HCl-naloxone HCl (SUBOXONE) 8-2 MG per film; Place 1 Film under the tongue 2 times daily  Dispense: 28 Film; Refill: 0  - DTx Griffin - Subst Use Disorder (RESET-O); Use as directed for 84 days.  Dispense: 1 each; Refill: 3     2. Opioid withdrawal (H)  - Reviewed use of below medications to treat opioid withdrawal symptoms.   - cloNIDine (CATAPRES) 0.1 MG tablet; Take 1 tablet (0.1 mg) by mouth 3 times daily as needed (opioid withdrawal, anxiety, restlessness, irritability, hot/cold flashes)  Dispense: 21 tablet; Refill: 0  - gabapentin (NEURONTIN) 300 MG capsule; Take 1 capsule (300 mg) by mouth 3 times daily as needed for other (withdrawal symptoms including anxiety, restlessness, sleeplessness)  Dispense: 21 capsule; Refill: 0  - traZODone (DESYREL) 50 MG tablet; Take 1 tablet (50 mg) by mouth At Bedtime  Dispense: 15 tablet; Refill: 0  - ondansetron (ZOFRAN ODT) 4 MG ODT tab; Take 1 tablet (4 mg) by mouth every 8 hours as needed for nausea  Dispense: 12 tablet; Refill: 0     Return in about 1 week (around 3/9/2023) for Follow up, with any available provider, in person.          03/10/23 visit:  Pt was able to restart buprenorphine after last visit,  reports taking 16mg TDD as prescribed.  Still experiencing hot/cold sweats, some body aches, fatigue, and difficulty sleeping.  Cravings for opioids have been decreasing.  No specific side effects related to buprenorphine.    Continues with moderately severe low back pain that radiates to L LE.  No benefit from injection therapy he received last week yet.  Continues to work with [facility].    Has been taking gabapentin 300mg tid since last visit.  No definite sedation related to gabapentin dosing.        [place] Prescription Drug Monitoring Program Reviewed:  Yes  03/03/2023  1   03/02/2023  Gabapentin 300 MG Capsule  21.00  7 Cl Max   [id number]   Cob ([id number])   0/0   [place]   03/03/2023  1   03/02/2023  Buprenorphine-Nalox 8-2mg Film  28.00  14 Cl Max   [id number]   Cob ([id number])   0/0  16.00 mg  [place]       PHQ 12/8/2022 3/2/2023 3/10/2023   PHQ-9 Total Score 15 4 5   Q9: Thoughts of better off dead/self-harm past 2 weeks Not at all Not at all Not at all         Medications:  gabapentin (NEURONTIN) 300 MG capsule, Take 1 capsule (300 mg) by mouth 3 times daily  DTx Griffin - Subst Use Disorder (RESET-O), Use as directed for 84 days.  naloxone (NARCAN) 4 MG/0.1ML nasal spray, Spray 1 spray (4 mg) into one nostril alternating nostrils as needed for opioid reversal every 2-3 minutes until assistance arrives  naproxen (NAPROSYN) 500 MG tablet, Take 500 mg by mouth every 12 hours as needed for moderate pain (4-6)    No current facility-administered medications on file prior to visit.      Allergies   Allergen Reactions     Cats Shortness Of Breath and Itching     Seafood Anaphylaxis       PMH, PSH, FamHx reviewed      OBJECTIVE                                                      BP (!) 132/98 (BP Location: Left arm, Patient Position: Sitting, Cuff Size: Adult Regular)   Pulse 91   Wt 79.4 kg (175 lb)     Physical Exam  Constitutional:       General: He is not in acute distress.     Appearance: Normal appearance. He  is not ill-appearing or diaphoretic.   HENT:      Nose: No rhinorrhea.   Eyes:      Extraocular Movements: Extraocular movements intact.   Cardiovascular:      Rate and Rhythm: Normal rate.   Pulmonary:      Effort: Pulmonary effort is normal.   Neurological:      Mental Status: He is alert and oriented to person, place, and time.   Psychiatric:         Attention and Perception: Attention and perception normal.         Mood and Affect: Mood is anxious and depressed. Affect is flat.         Speech: Speech normal. Speech is not rapid and pressured, delayed or slurred.         Behavior: Behavior is cooperative.         Thought Content: Thought content normal. Thought content does not include suicidal ideation. Thought content does not include suicidal plan.         Cognition and Memory: Cognition and memory normal.      Comments: Insight and judgment fair          Labs:    UDS:    Urine Drug Screen Results  AMP: Negative  BAR: Negative  BUP: Positive  BZO: Negative  JOSE M: Negative  mAMP: Negative  MDMA : Negative  MTD: Negative  [id number]: Negative  OXY: Negative  PCP : Negative  THC : Positive  *POC urine drug screen does not screen for Fentanyl      Recent Results (from the past 240 hour(s))   Fentanyl Urine, Qualitative    Collection Time: 03/02/23  3:53 PM   Result Value Ref Range    Fentanyl Qual Urine Screen Positive (A) Screen Negative         Patient counseling completed today:  Discussed mechanism of action, potential risks/benefits/side effects of medications and other recommendations above.  Recommend pt keep naloxone in their possession and reviewed other aspects of harm reduction to reduce risk of overdose with return to use.   Recommended avoiding concurrent use of alcohol, benzodiazepines or other sedatives with buprenorphine or other opioids.      Sandra Arevalo MD  Addiction Medicine  [facility]  [address] [facility], [address] [address]  [phone]

## 2023-03-10 NOTE — PROGRESS NOTES
[facility]      Rooming information:  Approximate last use of full opioid agonist: 7 days ago. Fentanyl pills  Taking buprenorphine? Yes:  As prescribed? Yes:   Number of buprenorphine films/tablets remaining currently: not too many  Side effects related to buprenorphine (constipation, dry mouth, sedation?) hard to tell  Narcan currently available: Yes  Other recent substance use:   Cannabis   NICOTINE-No  If using nicotine, ready to quit? NA    Point of care urine drug screen positive for:  No results found for: BUP, BZO, BAR, JOSE M, MAMP, AMP, MDMA, MTD, [id number], OXY, PCP, THC, TEMP, SGPOCT    *[facility] urine drug screen does not screen for Fentanyl        PHQ Assesment Total Score(s) 3/10/2023   PHQ-9 Score 5   Some recent data might be hidden       If PHQ-9 score of 15 or higher, has [facility] therapist or provider been notified? No    Any current suicidal ideation? No  If yes, has [facility] therapist or provider been notified? N/A    Primary care provider: Adele Nam MD (Inactive)     Mental health provider: denies (follow up on MH referral if needed)    Insurance needs: has been working    Housing needs: stable    Contact information up to date? yes    3rd Party Involvement NA (please obtain TANISHA if pt would like to include)    Lisa Machado  March 10, 2023  10:59 AM

## 2024-02-10 ENCOUNTER — HEALTH MAINTENANCE LETTER (OUTPATIENT)
Age: 34
End: 2024-02-10

## 2025-03-08 ENCOUNTER — HEALTH MAINTENANCE LETTER (OUTPATIENT)
Age: 35
End: 2025-03-08